# Patient Record
Sex: FEMALE | Race: WHITE | HISPANIC OR LATINO | Employment: OTHER | URBAN - METROPOLITAN AREA
[De-identification: names, ages, dates, MRNs, and addresses within clinical notes are randomized per-mention and may not be internally consistent; named-entity substitution may affect disease eponyms.]

---

## 2021-03-09 ENCOUNTER — OFFICE VISIT (OUTPATIENT)
Dept: VASCULAR SURGERY | Facility: CLINIC | Age: 36
End: 2021-03-09
Payer: COMMERCIAL

## 2021-03-09 DIAGNOSIS — I83.892 VARICOSE VEINS OF LEG WITH SWELLING, LEFT: ICD-10-CM

## 2021-03-09 DIAGNOSIS — I83.812 VARICOSE VEINS OF LEFT LOWER EXTREMITY WITH PAIN: Primary | ICD-10-CM

## 2021-03-09 PROCEDURE — 99203 OFFICE O/P NEW LOW 30 MIN: CPT | Performed by: SURGERY

## 2021-03-09 NOTE — LETTER
3/9/2021         RE: Ruthie Townsend  [address] [facility] [address]        Dear Colleague,    Thank you for referring your patient, Ruthie Townsend, to the [facility] [place]. Please see a copy of my visit note below.    After Visit Follow Up  Per pt request, faxed their compression hose Rx to [facility].   Pt would like 8 pair(s) of black, open-toe, thigh high, 20-30mmhg compression hose. Fax confirmed.    Pt aware they will be shipped to their home address.    Dave Leonardo RN      VEINSOLUTIONS CONSULTATION    HPI:    Ruthie Townsend is a pleasant 35 year old female who presents with complaints of recurrent left lower extremity pain and varicose veins.  Her veins 1st appeared with her 1st pregnancy.  She underwent left great saphenous vein radiofrequency ablation with phlebectomies on 1/17/2020 for primarily left calf pain and varicose veins.  She has had an episode of superficial thrombophlebitis of varicosities of the left medial calf following the birth of her 2nd child.    The patient states that her pain was improved after her treatment but, unfortunately, varicose veins have recurred rather quickly and discomfort in her left thigh and left calf seems to be interfering with actives of daily living, even making it difficult for her to get out and walk with her children.  She states that just yesterday she went for a 1 mile walk with her child but had to come home and elevate her leg because of the pain in the medial calf.  She has not noted any erythema or increased swelling of her calf but has noted not a frequent swelling of the left ankle after being on her feet for long periods of time and toward the end of the day.  The pain is worse when she stands for long periods of time and is described as an aching, tiredness and heaviness as well as a burning pain.  It improves with elevation of the leg.    She has not worn compression hose to any significant extent and did seem to be  aware that this may be beneficial.  She has no history of deep vein thrombosis or hemorrhage.  She does feel that her symptoms interfere with actives of daily living, making it difficult for her to be on her feet for 12 hours either standing or walking.    Her family history is significant for varicose veins in her father who has discoloration of his legs.    The patient also describes discoloration of her hands if she has been in a warm bath water for bathing kids and then brings it out, noting they look quite red.  She also describes cold sensitivity of her hands and discoloration of her fingers with cold exposure.    She is a non-smoker.    PAST MEDICAL HISTORY: No past medical history on file.    PAST SURGICAL HISTORY: No past surgical history on file.    FAMILY HISTORY: No family history on file.    SOCIAL HISTORY:   Social History     Tobacco Use     Smoking status: Never Smoker     Smokeless tobacco: Never Used   Substance Use Topics     Alcohol use: Not on file       REVIEW OF SYSTEMS: Review Of Systems  Skin: negative  Eyes: negative  Ears/Nose/Throat: negative  Respiratory: No shortness of breath, dyspnea on exertion, cough, or hemoptysis  Cardiovascular: negative  Gastrointestinal: negative  Genitourinary: negative  Musculoskeletal: Leg pain, leg swelling  Neurologic: negative  Psychiatric: negative  Hematologic/Lymphatic/Immunologic: Easy bruising  Endocrine: negative      Vital signs:  There were no vitals taken for this visit.    No current outpatient medications on file.       PHYSICAL EXAM:  General: Pleasant, NAD.   HEENT: Normocephalic, atraumatic, external ears and nose normal.   Respiratory: Normal respiratory effort.   Cardiovascular: Pulse is regular.   Musculoskeletal: Gait and station normal.  The joints of her fingers and toes without deformity.  There is no cyanosis of her nailbeds.   EXTREMITIES: Right lower extremity: There are reticular veins in the right popliteal fossa but I do not  appreciate significant varicose veins, venous stasis changes or significant edema.  Left lower extremity: 4 to 6 mm varicosities noted over the left medial thigh and left medial calf.  There is suggestion of some firmness and a varicosity on the left medial calf but no erythema or true induration suggestive of any acute thrombotic process.  No venous stasis changes.  Trace edema of her left ankle..    PULSES: R/L (3=normal pulse, 0=no palpable pulse) dorsalis pedis: 3/3; posterior tibial: 3/3.      Neurologic: Grossly normal  Psychiatric: Mood, affect, judgment and insight are normal     Venous Duplex Ultrasound:   I reviewed her venous ultrasound from [facility] dated 12/17/2019.  This revealed no evidence of deep vein thrombosis or deep vein valve incompetence.  Her left great saphenous vein was incompetent.  Her left anterior accessory saphenous vein, small saphenous vein were compressible.    Her post VNUS Closure ultrasound revealed a closed left great saphenous vein with no evidence of deep vein thrombosis.    ASSESSMENT:  Recurrent, symptomatic left lower extremity varicose veins with pain and swelling.  The the patient's activities of daily living are being impaired by the discomfort it difficult for her to be on her feet for long periods of time to care for her family or exercise.  She has had one episode of superficial thrombophlebitis and is concerned that this will recur.    She is quite surprised that the varicosities would recur this quickly following treatment only 1 year ago.    We discussed the anatomy and pathophysiology of venous insufficiency and venous reflux.  The option of continued conservative management with compression hose, leg elevation, dietary measures and exercise were discussed.  She has not worn compression hose to any significant extent, therefore we measured her for compression hose and will order them and mail them to her home.    We discussed the option of obtaining an  ultrasound of her left lower extremity veins if conservative measures do not control her symptoms.  Further treatment could be based on findings of ultrasound.  We briefly discussed endovenous ablation in the office setting with oral sedation and local anesthesia.  Risks were also discussed.    PLAN:  Left lower extremity venous competency study with video visit to discuss results     Robert Gordon MD    Dictated using Dragon voice recognition software which may result in transcription errors        VEINSOLUTIONS NEW PATIENT:                  Again, thank you for allowing me to participate in the care of your patient.        Sincerely,        Robert Gordon MD

## 2021-03-09 NOTE — PROGRESS NOTES
VEINSOLUTIONS CONSULTATION    HPI:    Ruthie Townsend is a pleasant 35 year old female who presents with complaints of recurrent left lower extremity pain and varicose veins.  Her veins 1st appeared with her 1st pregnancy.  She underwent left great saphenous vein radiofrequency ablation with phlebectomies on 1/17/2020 for primarily left calf pain and varicose veins.  She has had an episode of superficial thrombophlebitis of varicosities of the left medial calf following the birth of her 2nd child.    The patient states that her pain was improved after her treatment but, unfortunately, varicose veins have recurred rather quickly and discomfort in her left thigh and left calf seems to be interfering with actives of daily living, even making it difficult for her to get out and walk with her children.  She states that just yesterday she went for a 1 mile walk with her child but had to come home and elevate her leg because of the pain in the medial calf.  She has not noted any erythema or increased swelling of her calf but has noted not a frequent swelling of the left ankle after being on her feet for long periods of time and toward the end of the day.  The pain is worse when she stands for long periods of time and is described as an aching, tiredness and heaviness as well as a burning pain.  It improves with elevation of the leg.    She has not worn compression hose to any significant extent and did seem to be aware that this may be beneficial.  She has no history of deep vein thrombosis or hemorrhage.  She does feel that her symptoms interfere with actives of daily living, making it difficult for her to be on her feet for 12 hours either standing or walking.    Her family history is significant for varicose veins in her father who has discoloration of his legs.    The patient also describes discoloration of her hands if she has been in a warm bath water for bathing kids and then brings it out, noting they look quite  red.  She also describes cold sensitivity of her hands and discoloration of her fingers with cold exposure.    She is a non-smoker.    PAST MEDICAL HISTORY: No past medical history on file.    PAST SURGICAL HISTORY: No past surgical history on file.    FAMILY HISTORY: No family history on file.    SOCIAL HISTORY:   Social History     Tobacco Use     Smoking status: Never Smoker     Smokeless tobacco: Never Used   Substance Use Topics     Alcohol use: Not on file       REVIEW OF SYSTEMS: Review Of Systems  Skin: negative  Eyes: negative  Ears/Nose/Throat: negative  Respiratory: No shortness of breath, dyspnea on exertion, cough, or hemoptysis  Cardiovascular: negative  Gastrointestinal: negative  Genitourinary: negative  Musculoskeletal: Leg pain, leg swelling  Neurologic: negative  Psychiatric: negative  Hematologic/Lymphatic/Immunologic: Easy bruising  Endocrine: negative      Vital signs:  There were no vitals taken for this visit.    No current outpatient medications on file.       PHYSICAL EXAM:  General: Pleasant, NAD.   HEENT: Normocephalic, atraumatic, external ears and nose normal.   Respiratory: Normal respiratory effort.   Cardiovascular: Pulse is regular.   Musculoskeletal: Gait and station normal.  The joints of her fingers and toes without deformity.  There is no cyanosis of her nailbeds.   EXTREMITIES: Right lower extremity: There are reticular veins in the right popliteal fossa but I do not appreciate significant varicose veins, venous stasis changes or significant edema.  Left lower extremity: 4 to 6 mm varicosities noted over the left medial thigh and left medial calf.  There is suggestion of some firmness and a varicosity on the left medial calf but no erythema or true induration suggestive of any acute thrombotic process.  No venous stasis changes.  Trace edema of her left ankle..    PULSES: R/L (3=normal pulse, 0=no palpable pulse) dorsalis pedis: 3/3; posterior tibial: 3/3.      Neurologic:  Grossly normal  Psychiatric: Mood, affect, judgment and insight are normal     Venous Duplex Ultrasound:   I reviewed her venous ultrasound from [facility] dated 12/17/2019.  This revealed no evidence of deep vein thrombosis or deep vein valve incompetence.  Her left great saphenous vein was incompetent.  Her left anterior accessory saphenous vein, small saphenous vein were compressible.    Her post VNUS Closure ultrasound revealed a closed left great saphenous vein with no evidence of deep vein thrombosis.    ASSESSMENT:  Recurrent, symptomatic left lower extremity varicose veins with pain and swelling.  The the patient's activities of daily living are being impaired by the discomfort it difficult for her to be on her feet for long periods of time to care for her family or exercise.  She has had one episode of superficial thrombophlebitis and is concerned that this will recur.    She is quite surprised that the varicosities would recur this quickly following treatment only 1 year ago.    We discussed the anatomy and pathophysiology of venous insufficiency and venous reflux.  The option of continued conservative management with compression hose, leg elevation, dietary measures and exercise were discussed.  She has not worn compression hose to any significant extent, therefore we measured her for compression hose and will order them and mail them to her home.    We discussed the option of obtaining an ultrasound of her left lower extremity veins if conservative measures do not control her symptoms.  Further treatment could be based on findings of ultrasound.  We briefly discussed endovenous ablation in the office setting with oral sedation and local anesthesia.  Risks were also discussed.    PLAN:  Left lower extremity venous competency study with video visit to discuss results     Robert Gordon MD    Dictated using Dragon voice recognition software which may result in transcription  errors        VEINSOLUTIONS NEW PATIENT:

## 2021-03-09 NOTE — PROGRESS NOTES
After Visit Follow Up  Per pt request, faxed their compression hose Rx to [facility].   Pt would like 8 pair(s) of black, open-toe, thigh high, 20-30mmhg compression hose. Fax confirmed.    Pt aware they will be shipped to their home address.    Dave Leonardo RN

## 2021-03-22 ENCOUNTER — TELEPHONE (OUTPATIENT)
Dept: VASCULAR SURGERY | Facility: CLINIC | Age: 36
End: 2021-03-22

## 2021-03-22 NOTE — TELEPHONE ENCOUNTER
Pt saw CPN on 3-9-21 for a consult and he recommended compression hose, of which she has some further questions.  Please advise.

## 2021-03-22 NOTE — TELEPHONE ENCOUNTER
Called pt to answer her questions on compression hose.  Pt states she has noticed her left leg to be more swollen, she states it feels heavy and achy. She does have her menses.   She went to the [facility] urgent care over the weekend with concerns of the left leg. She states they did an U/S and she does not have a blood clot. She denies leg to be red, warm to touch or pain back of calf with walking. Recommend she continue to walk, wear compression hose, elevate legs when able to help with swelling, Tylenol or Ibuprofen for comfort. She verbalized understanding. She states she is traveling in the car for 4 hours at the end of the week. Reminded her to wear compression hose, and every hour to get out of car and walk for 10 min. She verbalized understanding. Pt will call with any concerns.

## 2021-06-08 ENCOUNTER — ANCILLARY PROCEDURE (OUTPATIENT)
Dept: ULTRASOUND IMAGING | Facility: CLINIC | Age: 36
End: 2021-06-08
Attending: SURGERY
Payer: COMMERCIAL

## 2021-06-08 DIAGNOSIS — I83.812 VARICOSE VEINS OF LEFT LOWER EXTREMITY WITH PAIN: ICD-10-CM

## 2021-06-08 PROCEDURE — 93971 EXTREMITY STUDY: CPT | Mod: LT | Performed by: SURGERY

## 2021-06-09 ENCOUNTER — TELEPHONE (OUTPATIENT)
Dept: VASCULAR SURGERY | Facility: CLINIC | Age: 36
End: 2021-06-09

## 2021-06-09 ENCOUNTER — VIRTUAL VISIT (OUTPATIENT)
Dept: VASCULAR SURGERY | Facility: CLINIC | Age: 36
End: 2021-06-09
Attending: SURGERY
Payer: COMMERCIAL

## 2021-06-09 DIAGNOSIS — I83.892 VARICOSE VEINS OF LEG WITH SWELLING, LEFT: ICD-10-CM

## 2021-06-09 DIAGNOSIS — I83.812 VARICOSE VEINS OF LEFT LOWER EXTREMITY WITH PAIN: Primary | ICD-10-CM

## 2021-06-09 PROCEDURE — 99213 OFFICE O/P EST LOW 20 MIN: CPT | Mod: 95 | Performed by: SURGERY

## 2021-06-09 NOTE — LETTER
6/9/2021         RE: Ruthie Townsend  [address] [facility] [address]        Dear Colleague,    Thank you for referring your patient, Ruthie Townsend, to the [facility] [place]. Please see a copy of my visit note below.    Ruthie is a 36 year old who is being evaluated via a billable video visit.      How would you like to obtain your AVS? MyChart  If the video visit is dropped, the invitation should be resent by: Text to cell phone: [phone]  Will anyone else be joining your video visit? No    Video Start Time: 10:04 AM      Video-Visit Details    Type of service:  Video Visit    Video End Time:10:31 AM    Originating Location (pt. Location): Home    Distant Location (provider location):  [facility] [place]     Platform used for Video Visit: Tumri     [facility] [place] Progress Note    Ruthie Townsend presents in follow-up of recurrent lower extremity pain and varicose veins.  Please see my consultation of 3/9/2021 for details.  She returned  On 6/8/2021 for a left lower extremity venous competency study, the details of which we will discuss on today's video visit.    Physical Exam  General: Pleasant female in no acute distress.  Blood pressure 129/81, pulse 91  Extremities: Left lower extremity: Left lower extremity: 4 to 6 mm varicosities over the left medial thigh and left medial calf.  Trace edema of her left ankle.  Mild hyperpigmentation over the medial calf varicosities.    Ultrasound:  Left lower extremity: No deep vein thrombosis or deep vein valve incompetence.  The left great saphenous vein is incompetent at the saphenofemoral junction and in the proximal thigh, measures 6.7 mm diameter distally with reflux time of 5.6 seconds.  It breaks through the fascia as a varicose vein 12 cm from the saphenofemoral junction supplying a varicose vein measuring 5.3 mm in diameter with reflux time of 5.7 seconds.  The below-knee left  great saphenous vein is incompetent with reflux time of 8.6 seconds.  Is a source of a 3.8 mm diameter incompetent vein branch with reflux time of greater than 500 ms.  The mid to distal calf great saphenous vein is competent.    The left small saphenous vein, vein of Giacomini and anterior sensory saphenous veins are competent.  There are no significant incompetent perforators.    The right common femoral vein demonstrates normal phasicity, compression and augmentation with no evidence of deep vein thrombosis.  The right common femoral vein is incompetent.    Assessment:  Recurrent left lower extremity pain and varicose veins status post radiofrequency ablation left great saphenous vein 1/17/2020 (elsewhere).  The varicosities coursing down the thigh originate from the proximal great saphenous vein and from the below-knee great saphenous vein.  The option of continued conservative measures with use of compression hose, leg elevation, dietary measures and exercise were discussed.  She has been using compression for more than 3 months and has not found adequate relief of her symptoms.  Risks of conservative management including superficial thrombophlebitis, bleeding and progression of the disease process were discussed.    If she chooses to have this treated, she will need to have the possible segment of the great saphenous vein ablated and I would also ablate the mid calf to knee segment of the below-knee great saphenous vein, the source of varicosities as well.  Tributaries from the great saphenous vein could be treated with concomitant phlebectomies or delayed sclerotherapy.  She prefers concomitant phlebectomies.  Risk of the treatment including bleeding, infection, nerve injury, scarring, hyperpigmentation, deep vein thrombosis, recanalization of the great saphenous vein and recurrent varicose veins were discussed.  She voiced understanding and her questions were answered.    Plan:  She will talk with her   and decide as to when she would like to have this treated.  She definitely wants to have it taken care of with concomitant phlebectomies.    Robert Gordon MD    Dictated using Dragon voice recognition software which may result in transcription errors            Again, thank you for allowing me to participate in the care of your patient.        Sincerely,        Robert Gordon MD

## 2021-06-09 NOTE — PROGRESS NOTES
Ruthie is a 36 year old who is being evaluated via a billable video visit.      How would you like to obtain your AVS? MyChart  If the video visit is dropped, the invitation should be resent by: Text to cell phone: [phone]  Will anyone else be joining your video visit? No    Video Start Time: 10:04 AM      Video-Visit Details    Type of service:  Video Visit    Video End Time:10:31 AM    Originating Location (pt. Location): Home    Distant Location (provider location):  [facility] [place]     Platform used for Video Visit: Luminescent     [facility] [place] Progress Note    Ruthie Townsend presents in follow-up of recurrent lower extremity pain and varicose veins.  Please see my consultation of 3/9/2021 for details.  She returned  On 6/8/2021 for a left lower extremity venous competency study, the details of which we will discuss on today's video visit.    Physical Exam  General: Pleasant female in no acute distress.  Blood pressure 129/81, pulse 91  Extremities: Left lower extremity: Left lower extremity: 4 to 6 mm varicosities over the left medial thigh and left medial calf.  Trace edema of her left ankle.  Mild hyperpigmentation over the medial calf varicosities.    Ultrasound:  Left lower extremity: No deep vein thrombosis or deep vein valve incompetence.  The left great saphenous vein is incompetent at the saphenofemoral junction and in the proximal thigh, measures 6.7 mm diameter distally with reflux time of 5.6 seconds.  It breaks through the fascia as a varicose vein 12 cm from the saphenofemoral junction supplying a varicose vein measuring 5.3 mm in diameter with reflux time of 5.7 seconds.  The below-knee left great saphenous vein is incompetent with reflux time of 8.6 seconds.  Is a source of a 3.8 mm diameter incompetent vein branch with reflux time of greater than 500 ms.  The mid to distal calf great saphenous vein is competent.    The left small saphenous  vein, vein of Giacomini and anterior sensory saphenous veins are competent.  There are no significant incompetent perforators.    The right common femoral vein demonstrates normal phasicity, compression and augmentation with no evidence of deep vein thrombosis.  The right common femoral vein is incompetent.    Assessment:  Recurrent left lower extremity pain and varicose veins status post radiofrequency ablation left great saphenous vein 1/17/2020 (elsewhere).  The varicosities coursing down the thigh originate from the proximal great saphenous vein and from the below-knee great saphenous vein.  The option of continued conservative measures with use of compression hose, leg elevation, dietary measures and exercise were discussed.  She has been using compression for more than 3 months and has not found adequate relief of her symptoms.  Risks of conservative management including superficial thrombophlebitis, bleeding and progression of the disease process were discussed.    If she chooses to have this treated, she will need to have the possible segment of the great saphenous vein ablated and I would also ablate the mid calf to knee segment of the below-knee great saphenous vein, the source of varicosities as well.  Tributaries from the great saphenous vein could be treated with concomitant phlebectomies or delayed sclerotherapy.  She prefers concomitant phlebectomies.  Risk of the treatment including bleeding, infection, nerve injury, scarring, hyperpigmentation, deep vein thrombosis, recanalization of the great saphenous vein and recurrent varicose veins were discussed.  She voiced understanding and her questions were answered.    Plan:  She will talk with her  and decide as to when she would like to have this treated.  She definitely wants to have it taken care of with concomitant phlebectomies.    Robert Gordon MD    Dictated using Dragon voice recognition software which may result in  transcription errors

## 2021-06-22 DIAGNOSIS — I83.812 VARICOSE VEINS OF LEFT LOWER EXTREMITY WITH PAIN: Primary | ICD-10-CM

## 2021-08-04 ENCOUNTER — TELEPHONE (OUTPATIENT)
Dept: VASCULAR SURGERY | Facility: CLINIC | Age: 36
End: 2021-08-04

## 2021-08-04 NOTE — TELEPHONE ENCOUNTER
Spoke with  Kole and answered questions regarding flying for vacation. Pt should wear her compression hose during flight and take ASA 325mg day before flight, day of flight and day post flight.   He stated they will be on vacation until 8/12/21 and was asking about getting sedation medications before procedure on 8/17/2021. He was told pharmacy will receive order for Ativan and Clonidine for them to  before procedure. She will receive a pre procedure call around 1 week prior to procedure to review information.  He verbalized understanding and will relay information to William

## 2021-08-04 NOTE — TELEPHONE ENCOUNTER
Please call  Kole Townsend at [phone].(Wife does not speak good English) Having Lft. VNUS closure and phlebs with CPN on 8/17/21. Has some questions before they go on vacation tomorrow.Thanks

## 2021-08-10 ENCOUNTER — TELEPHONE (OUTPATIENT)
Dept: VASCULAR SURGERY | Facility: CLINIC | Age: 36
End: 2021-08-10

## 2021-08-10 DIAGNOSIS — I83.892 VARICOSE VEINS OF LEG WITH SWELLING, LEFT: ICD-10-CM

## 2021-08-10 DIAGNOSIS — I83.812 VARICOSE VEINS OF LEFT LOWER EXTREMITY WITH PAIN: Primary | ICD-10-CM

## 2021-08-10 NOTE — TELEPHONE ENCOUNTER
Vein Clinic Preoperative Nurse Call    Procedure: Left leg VNUS closure GSV(med nec), 10-20 stab phlebs(med nec)  Date: Tuesday 8/17  Surgeon: Dr. Gordon  Time: 0900  Check in time: 0800    Called patient and left detailed message. Informed patient: when to check in (0800) to sign consent, to bring their preop medications in their original bottle with them (3mg ativan, 0.1mg clonidine). Patient will take the medications after signing the consent to the procedure. Instructed patient to wear a mask, wear loose-fitting comfortable clothing, and bring their compression hose. Ensured patient has a /someone that will be responsible for them the rest of the day. Visitors are allowed in the clinic, but they would need to stay in an exam room or wait in the parking lot or leave. If  does leave, IF POSSIBLE, we ask that they do not go more than 15-20 mins from our clinic. Once procedure is completed, we will keep patient in recovery for 30-45 mins, and call  with aftercare instructions. Informed patient, that if possible, they should sit in the backseat to elevate their leg on the ride home.    Pt needs thigh-high compression hose for procedure. Status of the hose: patient has thigh high hose ready to go at home.    Special COVID-19 instructions: Patient aware they need to wear a mask to our clinic and during their procedure. Patient aware that their  can come in with them, but would need to stay in an exam room during the procedure or wait in the parking lot or leave. Nurse will call pt's  when procedure is over.    Patient understands that if they have any of the following symptoms (fever, cough, shortness of breath, rash), they need to notify us immediately to cancel their procedure and will have to reschedule for a later date.    Gave patient our call back number if further questions.    Luly Jenkins RN  8/10/2021

## 2021-08-11 RX ORDER — CLONIDINE HYDROCHLORIDE 0.1 MG/1
TABLET ORAL
Qty: 1 TABLET | Refills: 0 | Status: SHIPPED | OUTPATIENT
Start: 2021-08-11 | End: 2021-08-20

## 2021-08-11 RX ORDER — LORAZEPAM 1 MG/1
TABLET ORAL
Qty: 3 TABLET | Refills: 0 | Status: SHIPPED | OUTPATIENT
Start: 2021-08-11 | End: 2021-08-20

## 2021-08-17 ENCOUNTER — OFFICE VISIT (OUTPATIENT)
Dept: VASCULAR SURGERY | Facility: CLINIC | Age: 36
End: 2021-08-17
Payer: COMMERCIAL

## 2021-08-17 VITALS — DIASTOLIC BLOOD PRESSURE: 68 MMHG | SYSTOLIC BLOOD PRESSURE: 104 MMHG | HEART RATE: 79 BPM | OXYGEN SATURATION: 98 %

## 2021-08-17 DIAGNOSIS — I83.812 VARICOSE VEINS OF LEFT LOWER EXTREMITY WITH PAIN: Primary | ICD-10-CM

## 2021-08-17 PROCEDURE — 37765 STAB PHLEB VEINS XTR 10-20: CPT | Mod: LT | Performed by: SURGERY

## 2021-08-17 PROCEDURE — 36475 ENDOVENOUS RF 1ST VEIN: CPT | Mod: LT | Performed by: SURGERY

## 2021-08-17 RX ORDER — HYDROCODONE BITARTRATE AND ACETAMINOPHEN 5; 325 MG/1; MG/1
1 TABLET ORAL EVERY 6 HOURS PRN
Qty: 2 TABLET | Refills: 0 | Status: SHIPPED | OUTPATIENT
Start: 2021-08-17 | End: 2021-08-20

## 2021-08-17 NOTE — PROGRESS NOTES
Pre-procedure Nursing Note    Ruthie Townsend presents to clinic for Vein Procedure  .   /Person Responsible for Patient: Kole ()  Phone Number: [phone]    Prophylactic Medication:N/A   Sedation Medication: Ativan, 3mg ,   Time Taken: 0806 and Clonidine, 0.1mg,   Time Taken: 0806  Compression Stockings: Hose at home  The procedure is being performed on LLE.  Patient understanding of procedure matches consent? YES    Patient's pre-procedure medications verified by SEBLE Mauricio.    Luly Jenkins RN on 8/17/2021 at 8:04 AM

## 2021-08-17 NOTE — PROGRESS NOTES
Vein Clinic Procedure Note    Indications:  Recurrent left lower extremity pain with varicose veins recalcitrant to conservative measures    Procedure:  1. Radiofrequency ablation along the segment left great saphenous vein;   2. Attempted radiofrequency ablation proximal thigh segment great saphenous vein-unsuccessful  3. Multiple stab phlebectomies left lower extremity (1020 stabs)    Surgeon  KAROLYN Gordon MD    Procedure Description  Details of the procedure including risks of bleeding, infection, nerve injury, scarring, hyperpigmentation, deep vein thrombosis, recanalization of the left great saphenous vein and recurrent varicose veins all discussed.  The patient voiced understanding and wished to proceed.  Informed consent was obtained.     I had the patient stand and marked varicosities from the proximal third of the thigh down to the medial calf  with and indelible marker.  We then proceeded the operating room, had the patient lie supine on the operating table, then prepped and draped the left lower extremity sterilely.    We took a timeout to confirm the appropriate operative site and procedure: Radiofrequency ablation left great saphenous vein with multiple phlebectomies    VNUS Closure  I imaged the left lower extremity easily identifying the below-knee segment of the great saphenous vein.. There was a segment of vein that was recanalized from the distal thigh to the proximal third of the calf from which side varicosities on the behalf had originated. I infiltrated the skin overlying the vein near the mid calf level, placed the puncture needle in the vein,followed by a micropuncture guidewire and a 7 Hebrew sheath.    I then imaged the proximal thigh and noted a segment of the left great saphenous vein from the saphenofemoral junction to the proximal thigh that supplied the varicosities on the left medial thigh I this vein angulated posteriorly, making it more difficult to access. I retrieved  the skin overlying the vein with 1% lidocaine with bicarbonate to pass a more lateral puncture guidewire to vein but was unsuccessful in doing so we accessed the vein but could not thread it through a tortuous segment of the vein, sending it into tight spasm. I tried to access the vein slightly more distally and was able to get the guidewire into the vein but could not thread the tortuous segment once again.    I turned my attention to the below-knee great saphenous vein. I threaded the closure fast device through the sheath up to the distal third of the thigh, positioning of the contrast which were negative as the vein cephalad and this was closed. Tumescent anesthetic was injected along the course of the vein. I then treated the great saphenous vein from the distal thigh to the proximal third of the leg with 2 RF sessions to the first 2 segments and one RF session to the remaining vein.  I reimaged the vein, found to be noncompressible is closed. The sheath and cath removed and hemostasis secured with pressure.    I once again imaged the left proximal thigh and noted that the short segment of the great saphenous vein remains a tight spasm and was not accessible. I debated on whether to perform sclerotherapy but, because the patient had received sedation and have not given informed consent, I discussed this with her but did not treat today.    Phlebectomies  Tumescent anesthetic was injected along the course of the marked varicosities and the block allowed to take effect. We made stab wounds beside each of the marked varicosities with 11 scalpel, retrieved the veins with either vein hooks or sameer hooks, clamped them with mosquito clamps and a avulsed them.  Hemostasis was secured with pressure.    After completing the phlebectomies, the leg was cleaned with saline solution and petroleum jelly was applied to the skin.  The leg was then dressed with ABD pads, cast padding and an Ace bandage from the toes to the  groin.   We observed the patient for 30 minutes to ensure excellent hemostasis then took her to her car in a wheelchair.  Post procedure instructions were given to the patient and her  in verbal and written form.  They both voiced understanding and their questions were answered.    The patient tolerated the procedure well without evidence of allergic reaction or other complications and will return in 72 hours for a left lower extremity venous ultrasound.    I discussed the fact that I cannot close proximal segment great saphenous vein with the patient and her . I will image this proximal segment myself at her 6-week postop visit to decide whether another attempted ablation or ultrasound-guided sclerotherapy would be the better options. I do feel this needs to be treated in an effort to decrease the likelihood of recurrence of the varicose veins    Venus Closure    Date/Time: 8/17/2021 10:24 AM  Performed by: Robert Gordon MD  Authorized by: Robert Gordon MD     Time out: Immediately prior to the procedure a time out was called    Preparation: Patient was prepped and draped in usual sterile fashion    1st Assist:  Za Kruse CST/LEO  Circulator:  Luly Jenkins RN  Procedure:  VNUS  Procedure side:  Left  One Vein    Vein Treated:  GSV  Patient tolerance:  Patient tolerated the procedure well with no immediate complications  Phlebectomy    Date/Time: 8/17/2021 10:24 AM  Performed by: Robert Gordon MD  Authorized by: Robert Gordon MD     Procedure:  Phlebectomies  Type:  Medically Necessary  Procedure side:  Left  Stabs:  10-20  Patient tolerance:  Patient tolerated the procedure well with no immediate complications  Wrap/Hose:  Wraps        Flowsheet Data 8/17/2021   Procedure Start Time:  9:16 AM   Prep: Chloraprep   Side: Left   Tx Length (cm): LEFT DISTAL GSV: 22   Junction (cm): LEFT DISTAL GSV: 0   RF Cycles: LEFT DISTAL GSV: 5   RF TX  Time (Minutes): LEFT DISTAL GSV: 1:40   # PHLEB Sites: Left le   Sedation taken: Yes   Pre Pt. Physical / Cognitive Limitations: WNL   TOTAL Local anesthesia Injected (ml): 12   Max Volume Local Anesthesia (ml): 22   TOTAL Tumescent Injected volume (ml): 100   Max Volume Tumescent (ml): 572   Post Pt. Physical / Cognitive Limitations: WNL   Procedure End Time: 10:09 AM   D/C Instructions given, states readiness to leave and escorted to car: Yes       C Anju Gordon MD    Dictated using Dragon voice recognition software which may result in transcription errors

## 2021-08-17 NOTE — LETTER
8/17/2021         RE: Ruthie Townsend  [address] [address]        Dear Colleague,    Thank you for referring your patient, Ruthie Townsend, to the [facility]IN [facility] [place]. Please see a copy of my visit note below.        Pre-procedure Nursing Note    Ruthie Townsend presents to clinic for Vein Procedure  .   /Person Responsible for Patient: Kole ()  Phone Number: [phone]    Prophylactic Medication:N/A   Sedation Medication: Ativan, 3mg ,   Time Taken: 0806 and Clonidine, 0.1mg,   Time Taken: 0806  Compression Stockings: Hose at home  The procedure is being performed on LLE.  Patient understanding of procedure matches consent? YES    Patient's pre-procedure medications verified by SEBLE Mauricio.    Luly Jenkins RN on 8/17/2021 at 8:04 AM                Vein Clinic Procedure Note    Indications:  Recurrent left lower extremity pain with varicose veins recalcitrant to conservative measures    Procedure:  1. Radiofrequency ablation along the segment left great saphenous vein;   2. Attempted radiofrequency ablation proximal thigh segment great saphenous vein-unsuccessful  3. Multiple stab phlebectomies left lower extremity (1020 stabs)    Surgeon  KAROLYN Gordon MD    Procedure Description  Details of the procedure including risks of bleeding, infection, nerve injury, scarring, hyperpigmentation, deep vein thrombosis, recanalization of the left great saphenous vein and recurrent varicose veins all discussed.  The patient voiced understanding and wished to proceed.  Informed consent was obtained.     I had the patient stand and marked varicosities from the proximal third of the thigh down to the medial calf  with and indelible marker.  We then proceeded the operating room, had the patient lie supine on the operating table, then prepped and draped the left lower extremity sterilely.    We took a timeout to confirm the appropriate operative site and procedure:  Radiofrequency ablation left great saphenous vein with multiple phlebectomies    VNUS Closure  I imaged the left lower extremity easily identifying the below-knee segment of the great saphenous vein.. There was a segment of vein that was recanalized from the distal thigh to the proximal third of the calf from which side varicosities on the behalf had originated. I infiltrated the skin overlying the vein near the mid calf level, placed the puncture needle in the vein,followed by a micropuncture guidewire and a 7 [place] Helenian sheath.    I then imaged the proximal thigh and noted a segment of the left great saphenous vein from the saphenofemoral junction to the proximal thigh that supplied the varicosities on the left medial thigh I this vein angulated posteriorly, making it more difficult to access. I retrieved the skin overlying the vein with 1% lidocaine with bicarbonate to pass a more lateral puncture guidewire to vein but was unsuccessful in doing so we accessed the vein but could not thread it through a tortuous segment of the vein, sending it into tight spasm. I tried to access the vein slightly more distally and was able to get the guidewire into the vein but could not thread the tortuous segment once again.    I turned my attention to the below-knee great saphenous vein. I threaded the closure fast device through the sheath up to the distal third of the thigh, positioning of the contrast which were negative as the vein cephalad and this was closed. Tumescent anesthetic was injected along the course of the vein. I then treated the great saphenous vein from the distal thigh to the proximal third of the leg with 2 RF sessions to the first 2 segments and one RF session to the remaining vein.  I reimaged the vein, found to be noncompressible is closed. The sheath and cath removed and hemostasis secured with pressure.    I once again imaged the left proximal thigh and noted that the short segment of the great saphenous  vein remains a tight spasm and was not accessible. I debated on whether to perform sclerotherapy but, because the patient had received sedation and have not given informed consent, I discussed this with her but did not treat today.    Phlebectomies  Tumescent anesthetic was injected along the course of the marked varicosities and the block allowed to take effect. We made stab wounds beside each of the marked varicosities with 11 scalpel, retrieved the veins with either vein hooks or sameer hooks, clamped them with mosquito clamps and a avulsed them.  Hemostasis was secured with pressure.    After completing the phlebectomies, the leg was cleaned with saline solution and petroleum jelly was applied to the skin.  The leg was then dressed with ABD pads, cast padding and an Ace bandage from the toes to the groin.   We observed the patient for 30 minutes to ensure excellent hemostasis then took her to her car in a wheelchair.  Post procedure instructions were given to the patient and her  in verbal and written form.  They both voiced understanding and their questions were answered.    The patient tolerated the procedure well without evidence of allergic reaction or other complications and will return in 72 hours for a left lower extremity venous ultrasound.    I discussed the fact that I cannot close proximal segment great saphenous vein with the patient and her . I will image this proximal segment myself at her 6-week postop visit to decide whether another attempted ablation or ultrasound-guided sclerotherapy would be the better options. I do feel this needs to be treated in an effort to decrease the likelihood of recurrence of the varicose veins    Venus Closure    Date/Time: 8/17/2021 10:24 AM  Performed by: Robert Gordon MD  Authorized by: Robert Gordon MD     Time out: Immediately prior to the procedure a time out was called    Preparation: Patient was prepped and draped in  usual sterile fashion    1st Assist:  Za Kruse CST/CSFA  Circulator:  Luly Jenkins RN  Procedure:  VNUS  Procedure side:  Left  One Vein    Vein Treated:  GSV  Patient tolerance:  Patient tolerated the procedure well with no immediate complications  Phlebectomy    Date/Time: 2021 10:24 AM  Performed by: Robert Gordon MD  Authorized by: Robert Gordon MD     Procedure:  Phlebectomies  Type:  Medically Necessary  Procedure side:  Left  Stabs:  10-20  Patient tolerance:  Patient tolerated the procedure well with no immediate complications  Wrap/Hose:  Wraps        Flowsheet Data 2021   Procedure Start Time:  9:16 AM   Prep: Chloraprep   Side: Left   Tx Length (cm): LEFT DISTAL GSV: 22   Junction (cm): LEFT DISTAL GSV: 0   RF Cycles: LEFT DISTAL GSV: 5   RF TX Time (Minutes): LEFT DISTAL GSV: 1:40   # PHLEB Sites: Left le   Sedation taken: Yes   Pre Pt. Physical / Cognitive Limitations: WNL   TOTAL Local anesthesia Injected (ml): 12   Max Volume Local Anesthesia (ml): 22   TOTAL Tumescent Injected volume (ml): 100   Max Volume Tumescent (ml): 572   Post Pt. Physical / Cognitive Limitations: WNL   Procedure End Time: 10:09 AM   D/C Instructions given, states readiness to leave and escorted to car: Yes       TONA Gordon MD    Dictated using Dragon voice recognition software which may result in transcription errors      Again, thank you for allowing me to participate in the care of your patient.        Sincerely,        Robert Gordon MD

## 2021-08-20 ENCOUNTER — ANCILLARY PROCEDURE (OUTPATIENT)
Dept: ULTRASOUND IMAGING | Facility: CLINIC | Age: 36
End: 2021-08-20
Attending: SURGERY
Payer: COMMERCIAL

## 2021-08-20 ENCOUNTER — OFFICE VISIT (OUTPATIENT)
Dept: VASCULAR SURGERY | Facility: CLINIC | Age: 36
End: 2021-08-20
Attending: SURGERY
Payer: COMMERCIAL

## 2021-08-20 DIAGNOSIS — Z09 POSTOP CHECK: Primary | ICD-10-CM

## 2021-08-20 DIAGNOSIS — I83.812 VARICOSE VEINS OF LEFT LOWER EXTREMITY WITH PAIN: ICD-10-CM

## 2021-08-20 PROCEDURE — 93971 EXTREMITY STUDY: CPT | Mod: LT | Performed by: SURGERY

## 2021-08-20 NOTE — PROGRESS NOTES
Vein Clinic Postoperative Nurse Note    Patient is here for their 72 hour follow up postoperative visit.    Procedure: Left leg VNUS closure GSV([facility]), 10-20 stab phlebs([facility])  Procedure Date: 8/17/2021  Surgeon: MATTHIAS Gordon MD    Ultrasound Result: The left lower extremity is negative for DVT. The left GSV is patent for 10 cm from the SFJ in the proximal thigh. The GSV is not visualized in the mid thigh. The GSV is closed from the distal thigh to the proximal calf with evidence of thrombus throughout.     Physical Exam: Incisions are approximated without signs of infection.  Ecchymosis: The left leg has moderate ecchymosis especially to the medial thigh.   Swelling: The left leg has minimal swelling noted.   Paresthesia: Patient denies numbness.     Noted inflammation to left lower leg incision/suture site. Due to inflammation, removed suture and applied bandaid to incision.           Patient Questions or Concerns: The patient is doing well and has no concerns. Denies pain.     Reviewed postoperative instructions with patient and provided them with written material of common things to expect from their procedure.     Patient's Next Vein Clinic Appointment: 6 week post op with Dr. Gordon (10/6/2021)    Ayaka Shields RN

## 2021-08-20 NOTE — LETTER
8/20/2021         RE: Ruthie Townsend  [address] [place] [address]        Dear Colleague,    Thank you for referring your patient, Ruthie Townsend, to the [facility]IN [facility] [place]. Please see a copy of my visit note below.        Vein Clinic Postoperative Nurse Note    Patient is here for their 72 hour follow up postoperative visit.    Procedure: Left leg VNUS closure GSV(med nec), 10-20 stab phlebs(med nec)  Procedure Date: 8/17/2021  Surgeon: MATTHIAS Gordon MD    Ultrasound Result: The left lower extremity is negative for DVT. The left GSV is patent for 10 cm from the SFJ in the proximal thigh. The GSV is not visualized in the mid thigh. The GSV is closed from the distal thigh to the proximal calf with evidence of thrombus throughout.     Physical Exam: Incisions are approximated without signs of infection.  Ecchymosis: The left leg has moderate ecchymosis especially to the medial thigh.   Swelling: The left leg has minimal swelling noted.   Paresthesia: Patient denies numbness.     Noted inflammation to left lower leg incision/suture site. Due to inflammation, removed suture and applied bandaid to incision.           Patient Questions or Concerns: The patient is doing well and has no concerns. Denies pain.     Reviewed postoperative instructions with patient and provided them with written material of common things to expect from their procedure.     Patient's Next Vein Clinic Appointment: 6 week post op with Dr. Gordon (10/6/2021)    Ayaka Shields RN      Again, thank you for allowing me to participate in the care of your patient.        Sincerely,         Vein Nurse

## 2021-08-20 NOTE — PATIENT INSTRUCTIONS
Vein Closure (Ablation)  Common Things to Expect    - Small lumps may develop beneath phlebectomy sites and the site where the vein closure device was inserted.  This is a normal step in healing.  These should not be painful, but may be tender to the touch. It can take 6 weeks to 3 months for these lumps/firmness to resolve.   - Bruising will look worse before it looks better and can last for 4-6 weeks.  - After about 10 days, you may notice tightness/pulling on the inside thigh and knee. As your ablated vein is healing, it contracts, causing a tightness or pulling sensation. This may last for several weeks, but will resolve. Treat it with Ibuprofen or Advil.  - Numbness will get better with time, but may take 3 months to a year to resolve.  - You may notice that the skin on your legs has become ultra-sensitive to touch. For example, the weight of your sheets may feel painful. This usually resolves in 6 weeks.  - Ankle swelling is not uncommon and may last 4-6 weeks.   - To get optimal results from your procedure, wearing your compression hose is key for the first 7 days. This is necessary to ensure proper closure of the ablated vein.  - For 2 weeks, no weight lifting over 25lbs, no running, and no vigorous aerobic exercise. After this time, ease back into your normal activities. If you do too much too soon, you will have more pain and bruising and possibly re-open the vein that was closed. It takes about 2 weeks for the ablated vein to permanently close. Keep in mind your body is still healing.  - For 2 weeks, do not shave your legs or use lotions, powders, creams to allow proper healing of phlebectomy sites and vein access sites.    Vein Removal (Phlebectomy)  Common Things to Expect     - Small lumps may develop beneath phlebectomy sites and the site where the vein closure device was inserted. This is a normal step in healing.  These should not be painful, but may be tender to the touch. It can take 6 weeks to 3  months for these lumps/firmness to resolve.  - Bruising will look worse before it looks better and can last for 4-6 weeks.  - Ankle swelling is not uncommon and may last 4-6 weeks.       If you are experiencing any of the following symptoms, please seek immediate medical attention at your local emergency department.  - Significant pain in the back of the calf possibly with difficulty walking  - Significant swelling and/or tenderness in the back of the calf  - Redness that continues to spread  - Chest pain and/or shortness of breath

## 2021-10-06 ENCOUNTER — OFFICE VISIT (OUTPATIENT)
Dept: VASCULAR SURGERY | Facility: CLINIC | Age: 36
End: 2021-10-06
Payer: COMMERCIAL

## 2021-10-06 DIAGNOSIS — I83.812 VARICOSE VEINS OF LEFT LOWER EXTREMITY WITH PAIN: Primary | ICD-10-CM

## 2021-10-06 PROCEDURE — 99207 PR VEINSOLUTIONS POST OPERATIVE VISIT: CPT | Performed by: SURGERY

## 2021-10-06 NOTE — PROGRESS NOTES
[facility] [place] office note  Ruthie Townsend returns for 6-week follow-up of radiofrequency ablation of the recanalized left great saphenous vein and phlebectomies.  I was unable to access the short proximal thigh segment of the great saphenous vein at her initial procedure and explained this to her today.    Overall she feels she is healing well and has no complaints.  She says that her  preoperative leg pain is improved.  She admits that she has some fullness and discomfort in the left medial calf with her menses.  She also feels that the left proximal calf may be slightly larger than the right.    Physical exam  General: Pleasant female no acute distress  Left lower extremity: No visible varicose veins.  There is mild ecchymosis of the proximal calf.  The phlebectomy sites are healing satisfactorily.  There is no ankle edema.    There is a reticular veins visible on the lateral right popliteal fossa of cosmetic concern to her.    Impression  Good result following ablation of a recanalized segment of the left great saphenous vein with phlebectomies.  I showed her her preoperative ultrasound with the proximal, short segment of the left great saphenous vein which was the source of the varicose veins on her left thigh.  This segment remains, therefore high feel that treating this segment would reduce the risk of recurrence.  Details of ultrasound-guided sclerotherapy including risks of allergic reaction, deep vein thrombosis, hyperpigmentation and ulceration were discussed with the patient and her , both of whom voiced understanding and his questions were answered.    There is also a reticular vein on the lateral right popliteal fossa which she would like to have treated for cosmetic purposes.  She understands that there will be a cosmetic charge for this procedure, estimates for which were given today.    Questions asked about recurrence of.  I discussed with the patient and her  that there is  a 25 to 30% chance that she will develop other varicose veins with extension may have further treatment.  Remaining fit with regular exercise and controlling her weight as well as wearing compression are the most important factors in reducing the risk of recurrence.    Plan  Return for ultrasound-guided sclerotherapy of the proximal thigh segment of the great saphenous vein and a right lateral popliteal fossa reticular vein.  This will not be billed to insurance as they are having great difficulty with insurance coverage for her medically necessary ablation.  We had a good discussion and both the patient and her  are clear and are ready to proceed.    TONA Gordon MD    Dictated using Dragon voice recognition software which may result in transcription errors.

## 2021-10-06 NOTE — LETTER
10/6/2021         RE: Ruthie Townsend  [address] [address]        Dear Colleague,    Thank you for referring your patient, Ruthie Townsend, to the [facility] [place]. Please see a copy of my visit note below.    [facility] office note  Ruthie Townsend returns for 6-week follow-up of radiofrequency ablation of the recanalized left great saphenous vein and phlebectomies.  I was unable to access the short proximal thigh segment of the great saphenous vein at her initial procedure and explained this to her today.    Overall she feels she is healing well and has no complaints.  She says that her  preoperative leg pain is improved.  She admits that she has some fullness and discomfort in the left medial calf with her menses.  She also feels that the left proximal calf may be slightly larger than the right.    Physical exam  General: Pleasant female no acute distress  Left lower extremity: No visible varicose veins.  There is mild ecchymosis of the proximal calf.  The phlebectomy sites are healing satisfactorily.  There is no ankle edema.    There is a reticular veins visible on the lateral right popliteal fossa of cosmetic concern to her.    Impression  Good result following ablation of a recanalized segment of the left great saphenous vein with phlebectomies.  I showed her her preoperative ultrasound with the proximal, short segment of the left great saphenous vein which was the source of the varicose veins on her left thigh.  This segment remains, therefore high feel that treating this segment would reduce the risk of recurrence.  Details of ultrasound-guided sclerotherapy including risks of allergic reaction, deep vein thrombosis, hyperpigmentation and ulceration were discussed with the patient and her , both of whom voiced understanding and his questions were answered.    There is also a reticular vein on the lateral right popliteal fossa which she would  like to have treated for cosmetic purposes.  She understands that there will be a cosmetic charge for this procedure, estimates for which were given today.    Questions asked about recurrence of.  I discussed with the patient and her  that there is a 25 to 30% chance that she will develop other varicose veins with extension may have further treatment.  Remaining fit with regular exercise and controlling her weight as well as wearing compression are the most important factors in reducing the risk of recurrence.    Plan  Return for ultrasound-guided sclerotherapy of the proximal thigh segment of the great saphenous vein and a right lateral popliteal fossa reticular vein.  This will not be billed to insurance as they are having great difficulty with insurance coverage for her medically necessary ablation.  We had a good discussion and both the patient and her  are clear and are ready to proceed.    TONA Gordon MD    Dictated using Dragon voice recognition software which may result in transcription errors.      Again, thank you for allowing me to participate in the care of your patient.        Sincerely,        Robert Gordon MD

## 2021-11-20 ENCOUNTER — HEALTH MAINTENANCE LETTER (OUTPATIENT)
Age: 36
End: 2021-11-20

## 2022-01-26 ENCOUNTER — OFFICE VISIT (OUTPATIENT)
Dept: VASCULAR SURGERY | Facility: CLINIC | Age: 37
End: 2022-01-26

## 2022-01-26 DIAGNOSIS — I78.1 SPIDER TELANGIECTASIS OF SKIN: ICD-10-CM

## 2022-01-26 DIAGNOSIS — I83.92 ASYMPTOMATIC VARICOSE VEINS OF LEFT LOWER EXTREMITY: ICD-10-CM

## 2022-01-26 PROCEDURE — 36468 NJX SCLRSNT SPIDER VEINS: CPT | Performed by: SURGERY

## 2022-01-26 PROCEDURE — S9999 SALES TAX: HCPCS | Performed by: SURGERY

## 2022-01-26 NOTE — PROGRESS NOTES
Vein Clinic Sclerotherapy Note     Indications:  1.  Residual left proximal thigh varicose vein-possible left great saphenous vein remnant  2.  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  Ultrasound-guided sclerotherapy left proximal thigh great saphenous vein remnant  2.  Bilateral lower extremity cosmetic sclerotherapy     Procedure description  Details of procedure including risks of allergic reaction, deep vein thrombosis, ulceration, superficial thrombophlebitis and hyperpigmentation were discussed.  The patient voiced understanding and wished to proceed.  Informed consent was obtained.    I imaged the proximal left thigh noting the remnant of the great saphenous vein which I attempted to treat with endovenous ablation unsuccessfully.  I isolated this vein with ultrasound, directed a 27-gauge needle into it and directed 1% polidocanol foam filling the vein nicely.  I had the patient perform ankle pumps on the table.    There was a prominent reticular vein on the right lateral popliteal fossa that I treated after donning the Syris headlight.  I injected this with 0.5% polidocanol foam.  There are a few scattered areas on both legs which we also treated with 0.5% polidocanol.  I had the patient perform ankle pumps.    We cleaned her legs, had her don thigh-high compression, had a walk for 10 minutes and we will have her return in 6 weeks in follow-up.  She tolerated procedure well without evidence of restriction or other complications.  Procedures    TONA Gordon MD    Dictated using Dragon voice recognition software which may result in transcription errors

## 2022-01-26 NOTE — LETTER
1/26/2022         RE: Ruthie Townsend  [address] [facility] [address]        Dear Colleague,    Thank you for referring your patient, Ruthie Townsend, to the [facility] [place]. Please see a copy of my visit note below.        Vein Clinic Sclerotherapy Note     Indications:  1.  Residual left proximal thigh varicose vein-possible left great saphenous vein remnant  2.  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  Ultrasound-guided sclerotherapy left proximal thigh great saphenous vein remnant  2.  Bilateral lower extremity cosmetic sclerotherapy     Procedure description  Details of procedure including risks of allergic reaction, deep vein thrombosis, ulceration, superficial thrombophlebitis and hyperpigmentation were discussed.  The patient voiced understanding and wished to proceed.  Informed consent was obtained.    I imaged the proximal left thigh noting the remnant of the great saphenous vein which I attempted to treat with endovenous ablation unsuccessfully.  I isolated this vein with ultrasound, directed a 27-gauge needle into it and directed 1% polidocanol foam filling the vein nicely.  I had the patient perform ankle pumps on the table.    There was a prominent reticular vein on the right lateral popliteal fossa that I treated after donning the Syris headlight.  I injected this with 0.5% polidocanol foam.  There are a few scattered areas on both legs which we also treated with 0.5% polidocanol.  I had the patient perform ankle pumps.    We cleaned her legs, had her don thigh-high compression, had a walk for 10 minutes and we will have her return in 6 weeks in follow-up.  She tolerated procedure well without evidence of restriction or other complications.  Procedures    C Anju Gordon MD    Dictated using Dragon voice recognition software which may result in transcription errors      Again, thank you for allowing me  to participate in the care of your patient.        Sincerely,        Robert Gordon MD

## 2022-03-23 ENCOUNTER — ANCILLARY PROCEDURE (OUTPATIENT)
Dept: ULTRASOUND IMAGING | Facility: CLINIC | Age: 37
End: 2022-03-23
Attending: SURGERY
Payer: COMMERCIAL

## 2022-03-23 ENCOUNTER — OFFICE VISIT (OUTPATIENT)
Dept: VASCULAR SURGERY | Facility: CLINIC | Age: 37
End: 2022-03-23
Attending: SURGERY
Payer: COMMERCIAL

## 2022-03-23 DIAGNOSIS — I83.812 VARICOSE VEINS OF LEFT LOWER EXTREMITY WITH PAIN: ICD-10-CM

## 2022-03-23 DIAGNOSIS — I78.1 SPIDER TELANGIECTASIS OF SKIN: Primary | ICD-10-CM

## 2022-03-23 PROCEDURE — 93971 EXTREMITY STUDY: CPT | Mod: LT | Performed by: SURGERY

## 2022-03-23 PROCEDURE — S9999 SALES TAX: HCPCS | Performed by: SURGERY

## 2022-03-23 PROCEDURE — 36468 NJX SCLRSNT SPIDER VEINS: CPT | Performed by: SURGERY

## 2022-03-23 NOTE — PROGRESS NOTES
Vein Clinic Sclerotherapy Note     Indications:  Residual bilateral lower extremity spider telangiectasias and reticular veins of cosmetic concern; status post ultrasound-guided sclerotherapy residual segment left proximal thigh great saphenous vein     Procedure:  Bilateral lower extremity cosmetic sclerotherapy     Procedure description  Details of procedure including risks of allergic reaction, deep vein thrombosis, ulceration, superficial thrombophlebitis and hyperpigmentation were discussed.  The patient voiced understanding and wished to proceed.  Informed consent was obtained.    Ruthie had a 6-month follow-up ultrasound and follow-up of radiofrequency ablation of the left great saphenous vein.  The ultrasound reveals closure of the great saphenous vein 19 mm from the saphenofemoral junction to the mid thigh and then nonvisualization below.  This confirms that the segment that we treated with sclerotherapy at her last visit is now closed.    She has some residual telangiectasias and reticular veins on the posterior lateral, proximal  right leg and a few scattered on the left leg as well.  There is some trapped blood in the treated veins in the left popliteal crease as well as in the lateral right popliteal fossa.    I donned the Syris headlight and treated telangiectasias in the a forementioned areas using 2 syringes of 0.5% polidocanol foam.  I then cleaned areas of trapped blood with alcohol, made stab wounds with an 18-gauge needle and expressed thrombus.    Patient tolerated procedure well without evidence of allergic reaction or other complications and will return in 6 weeks in follow-up.  She likely will not need treatment at that time but I will simply check for trapped blood.    Sclerotherapy    Date/Time: 3/23/2022 11:09 AM  Performed by: Robert Gordon MD  Authorized by: Robert Gordon MD     Time out: Immediately prior to the procedure a time out was called    Type:   Cosmetic  Session:  Limited  Procedure side:  Bilateral  Solution/Amount:  .5 POLIDOCANOL  Syringes:  2  Evacuation of intraluminal thrombus under sterile conditions without complications.    Patient tolerance:  Patient tolerated the procedure well with no immediate complications  Wrap/Hose:  Wil Gordon MD    Dictated using Dragon voice recognition software which may result in transcription errors

## 2022-03-23 NOTE — LETTER
3/23/2022         RE: Ruthie Townsend  [address] [facility] [address]        Dear Colleague,    Thank you for referring your patient, Ruthie Townsend, to the [facility] [place]. Please see a copy of my visit note below.        Vein Clinic Sclerotherapy Note     Indications:  Residual bilateral lower extremity spider telangiectasias and reticular veins of cosmetic concern; status post ultrasound-guided sclerotherapy residual segment left proximal thigh great saphenous vein     Procedure:  Bilateral lower extremity cosmetic sclerotherapy     Procedure description  Details of procedure including risks of allergic reaction, deep vein thrombosis, ulceration, superficial thrombophlebitis and hyperpigmentation were discussed.  The patient voiced understanding and wished to proceed.  Informed consent was obtained.    Ruthie had a 6-month follow-up ultrasound and follow-up of radiofrequency ablation of the left great saphenous vein.  The ultrasound reveals closure of the great saphenous vein 19 mm from the saphenofemoral junction to the mid thigh and then nonvisualization below.  This confirms that the segment that we treated with sclerotherapy at her last visit is now closed.    She has some residual telangiectasias and reticular veins on the posterior lateral, proximal  right leg and a few scattered on the left leg as well.  There is some trapped blood in the treated veins in the left popliteal crease as well as in the lateral right popliteal fossa.    I donned the Syris headlight and treated telangiectasias in the a forementioned areas using 2 syringes of 0.5% polidocanol foam.  I then cleaned areas of trapped blood with alcohol, made stab wounds with an 18-gauge needle and expressed thrombus.    Patient tolerated procedure well without evidence of allergic reaction or other complications and will return in 6 weeks in follow-up.  She likely will not need treatment at that time but I will  simply check for trapped blood.    Sclerotherapy    Date/Time: 3/23/2022 11:09 AM  Performed by: Robert Gordon MD  Authorized by: Robert Gordon MD     Time out: Immediately prior to the procedure a time out was called    Type:  Cosmetic  Session:  Limited  Procedure side:  Bilateral  Solution/Amount:  .5 POLIDOCANOL  Syringes:  2  Evacuation of intraluminal thrombus under sterile conditions without complications.    Patient tolerance:  Patient tolerated the procedure well with no immediate complications  Wrap/Hose:  Tee        C Anju Gordon MD    Dictated using Dragon voice recognition software which may result in transcription errors      Again, thank you for allowing me to participate in the care of your patient.        Sincerely,        Robert Gordon MD

## 2022-05-25 ENCOUNTER — OFFICE VISIT (OUTPATIENT)
Dept: VASCULAR SURGERY | Facility: CLINIC | Age: 37
End: 2022-05-25
Payer: COMMERCIAL

## 2022-05-25 DIAGNOSIS — I78.1 SPIDER TELANGIECTASIS OF SKIN: Primary | ICD-10-CM

## 2022-05-25 PROCEDURE — 99207 PR VEINSOLUTIONS NO CHARGE VISIT: CPT | Performed by: SURGERY

## 2022-05-25 NOTE — LETTER
5/25/2022         RE: Ruthie Townsend  [address] [address]        Dear Colleague,    Thank you for referring your patient, Ruthie Townsend, to the [facility] [place]. Please see a copy of my visit note below.    [facility] Ve[facility] data office note  Ruthie Townsend returns in follow-up of cosmetic sclerotherapy for reticular veins and telangiectasias of cosmetic concern on 3/23/2022.  Overall she is doing quite well and is very pleased with the outcome, both in terms of pain relief with her medically necessary treatment, and cosmetic improvement with cosmetic treatments.    Physical exam  Left lower extremity no residual varicose veins  There are few scattered areas of mild hyperpigmentation over the distal posterior left thigh but nothing of significance.  Mild hyperpigmentation over the thigh from sclerotherapy    Impression  Good result with the above treatment.    Plan  She will return on an as-needed basis.    MATTHIAS Gordon MD    Dictated using Dragon voice recognition software which may result in transcription errors      Again, thank you for allowing me to participate in the care of your patient.        Sincerely,        Robert Gordon MD

## 2022-05-31 NOTE — PROGRESS NOTES
[facility] [place] data office note  Ruthie Townsend returns in follow-up of cosmetic sclerotherapy for reticular veins and telangiectasias of cosmetic concern on 3/23/2022.  Overall she is doing quite well and is very pleased with the outcome, both in terms of pain relief with her medically necessary treatment, and cosmetic improvement with cosmetic treatments.    Physical exam  Left lower extremity no residual varicose veins  There are few scattered areas of mild hyperpigmentation over the distal posterior left thigh but nothing of significance.  Mild hyperpigmentation over the thigh from sclerotherapy    Impression  Good result with the above treatment.    Plan  She will return on an as-needed basis.    MATTHIAS Gordon MD    Dictated using Dragon voice recognition software which may result in transcription errors

## 2023-03-15 PROCEDURE — 88305 TISSUE EXAM BY PATHOLOGIST: CPT | Performed by: PATHOLOGY

## 2023-03-16 ENCOUNTER — LAB REQUISITION (OUTPATIENT)
Dept: LAB | Facility: CLINIC | Age: 38
End: 2023-03-16

## 2023-03-16 DIAGNOSIS — D12.8 BENIGN NEOPLASM OF RECTUM: ICD-10-CM

## 2023-03-16 DIAGNOSIS — R10.84 GENERALIZED ABDOMINAL PAIN: ICD-10-CM

## 2023-03-16 DIAGNOSIS — K62.89 OTHER SPECIFIED DISEASES OF ANUS AND RECTUM: ICD-10-CM

## 2023-03-17 LAB
PATH REPORT.COMMENTS IMP SPEC: NORMAL
PATH REPORT.FINAL DX SPEC: NORMAL
PATH REPORT.GROSS SPEC: NORMAL
PATH REPORT.MICROSCOPIC SPEC OTHER STN: NORMAL
PATH REPORT.RELEVANT HX SPEC: NORMAL
PHOTO IMAGE: NORMAL

## 2023-04-23 ENCOUNTER — HEALTH MAINTENANCE LETTER (OUTPATIENT)
Age: 38
End: 2023-04-23

## 2024-06-29 ENCOUNTER — HEALTH MAINTENANCE LETTER (OUTPATIENT)
Age: 39
End: 2024-06-29

## 2025-03-30 ENCOUNTER — HEALTH MAINTENANCE LETTER (OUTPATIENT)
Age: 40
End: 2025-03-30